# Patient Record
Sex: MALE | Race: WHITE | Employment: UNEMPLOYED | URBAN - METROPOLITAN AREA
[De-identification: names, ages, dates, MRNs, and addresses within clinical notes are randomized per-mention and may not be internally consistent; named-entity substitution may affect disease eponyms.]

---
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
## 2023-03-03 ENCOUNTER — TELEPHONE (OUTPATIENT)
Dept: DERMATOLOGY | Age: 18
End: 2023-03-03

## 2023-03-06 ENCOUNTER — TELEPHONE (OUTPATIENT)
Dept: DERMATOLOGY | Age: 18
End: 2023-03-06

## 2023-03-08 ENCOUNTER — TELEPHONE (OUTPATIENT)
Dept: DERMATOLOGY | Age: 18
End: 2023-03-08

## 2023-03-08 NOTE — TELEPHONE ENCOUNTER
Insurance company informed PA for continuation of therapy was sent 3/8/2023 and they will inform patient.

## 2023-03-10 ENCOUNTER — TELEPHONE (OUTPATIENT)
Dept: DERMATOLOGY | Age: 18
End: 2023-03-10

## 2023-03-10 NOTE — TELEPHONE ENCOUNTER
Pts mom called and states his insurance denied Cosentyx and would like to know if his work insurance is now his primary or secondary insurance.  I discussed with mom to have pt call his insurance companies to determine

## 2023-03-14 ENCOUNTER — TELEPHONE (OUTPATIENT)
Dept: DERMATOLOGY | Age: 18
End: 2023-03-14

## 2023-03-14 NOTE — TELEPHONE ENCOUNTER
Patient's grandmother, Danie Jack, brought  in his insurance cards to the Dermatology Office. The patient wants his insurance cards faxed to his insurance companies to approve his medication. He wants to get his medication asap.

## 2023-03-14 NOTE — TELEPHONE ENCOUNTER
I called and spoke to [employer]. They state they dont have anything from us since last May. PA has been sent and scanned in.

## 2023-03-21 NOTE — TELEPHONE ENCOUNTER
I informed the patients grandmother when she called that the PA has been submitted with the insurance cards, and put [facility] on there so that it can be started asap. Once we submit it to [facility], that is out of our control  how long it takes unfortunately.

## 2023-05-15 ENCOUNTER — OFFICE VISIT (OUTPATIENT)
Dept: DERMATOLOGY | Age: 18
End: 2023-05-15
Payer: COMMERCIAL

## 2023-05-15 ENCOUNTER — HOSPITAL ENCOUNTER (OUTPATIENT)
Age: 18
Setting detail: SPECIMEN
Discharge: HOME | End: 2023-05-15

## 2023-05-15 VITALS
DIASTOLIC BLOOD PRESSURE: 73 MMHG | HEIGHT: 68 IN | TEMPERATURE: 98.6 F | SYSTOLIC BLOOD PRESSURE: 114 MMHG | HEART RATE: 64 BPM | OXYGEN SATURATION: 98 % | BODY MASS INDEX: 19.73 KG/M2 | WEIGHT: 130.2 LBS

## 2023-05-15 DIAGNOSIS — Z79.899 HIGH RISK MEDICATION USE: ICD-10-CM

## 2023-05-15 DIAGNOSIS — L40.9 PSORIASIS: Primary | ICD-10-CM

## 2023-05-15 LAB
ALBUMIN SERPL-MCNC: 4.4 G/DL (ref 3.5–5.2)
ALBUMIN/GLOB SERPL: 1.5 {RATIO} (ref 1–2.5)
ALP SERPL-CCNC: 64 U/L (ref 40–129)
ALT SERPL-CCNC: 15 U/L (ref 5–41)
ANION GAP SERPL CALCULATED.3IONS-SCNC: 9 MMOL/L (ref 9–17)
AST SERPL-CCNC: 14 U/L
BASOPHILS # BLD: 0.05 K/UL (ref 0–0.2)
BASOPHILS # BLD: 1 % (ref 0–2)
BILIRUB SERPL-MCNC: 0.4 MG/DL (ref 0.3–1.2)
BUN SERPL-MCNC: 9 MG/DL (ref 6–20)
CALCIUM SERPL-MCNC: 9.6 MG/DL (ref 8.6–10.4)
CHLORIDE SERPL-SCNC: 103 MMOL/L (ref 98–107)
CO2 SERPL-SCNC: 28 MMOL/L (ref 20–31)
CREAT SERPL-MCNC: 0.86 MG/DL (ref 0.7–1.2)
EOSINOPHIL # BLD: 0.3 K/UL (ref 0–0.44)
EOSINOPHILS RELATIVE PERCENT: 5 % (ref 1–4)
ERYTHROCYTE [DISTWIDTH] IN BLOOD BY AUTOMATED COUNT: 12 % (ref 11.8–14.4)
GFR SERPL CREATININE-BSD FRML MDRD: >60 ML/MIN/1.73M2
GLUCOSE SERPL-MCNC: 98 MG/DL (ref 70–99)
HCT VFR BLD AUTO: 48.2 % (ref 40.7–50.3)
HGB BLD-MCNC: 15.9 G/DL (ref 13–17)
IMM GRANULOCYTES # BLD AUTO: <0.03 K/UL (ref 0–0.3)
IMM GRANULOCYTES NFR BLD: 0 %
LYMPHOCYTES # BLD: 27 % (ref 25–45)
LYMPHOCYTES NFR BLD: 1.55 K/UL (ref 1.2–5.2)
MCH RBC QN AUTO: 29.3 PG (ref 25.2–33.5)
MCHC RBC AUTO-ENTMCNC: 33 G/DL (ref 28.4–34.8)
MCV RBC AUTO: 88.9 FL (ref 82.6–102.9)
MONOCYTES NFR BLD: 0.57 K/UL (ref 0.1–1.4)
MONOCYTES NFR BLD: 10 % (ref 2–8)
NEUTROPHILS NFR BLD: 57 % (ref 34–64)
NEUTS SEG NFR BLD: 3.37 K/UL (ref 1.8–8)
NRBC AUTOMATED: 0 PER 100 WBC
PLATELET # BLD AUTO: 203 K/UL (ref 138–453)
PMV BLD AUTO: 10.4 FL (ref 8.1–13.5)
POTASSIUM SERPL-SCNC: 5.1 MMOL/L (ref 3.7–5.3)
PROT SERPL-MCNC: 7.4 G/DL (ref 6.4–8.3)
RBC # BLD AUTO: 5.42 M/UL (ref 4.21–5.77)
SODIUM SERPL-SCNC: 140 MMOL/L (ref 135–144)
WBC OTHER # BLD: 5.9 K/UL (ref 4.5–13.5)

## 2023-05-15 PROCEDURE — 99214 OFFICE O/P EST MOD 30 MIN: CPT | Performed by: DERMATOLOGY

## 2023-05-15 NOTE — PROGRESS NOTES
Dermatology Patient Note  [address] [employer] [employer]  [address] [facility] [address]  [address]  Dept: [phone]  Dept Fax: [phone]      VISITDATE: 5/15/2023   REFERRING PROVIDER: No ref. provider found      Di Conrad is a 21 y.o. male  who presents today in the office for:    Follow-up (Cosentyx)      HISTORY OF PRESENT ILLNESS:  6 month psoriasis follow up. The patient has been on Cosentyx for several years with good control of psoriasis. He has recently had difficulty getting Cosentyx due to insurance issues and is interested in switching to an oral medication. He is currently flaring on the extremities. Last Cosentyx dose was 2 months ago. No recent infections or hospitalizations. No SE reported at this time. The patient does not drink alcohol. MEDICAL PROBLEMS:  Patient Active Problem List    Diagnosis Date Noted    Depression     Anxiety 09/08/2015    Asthma        CURRENT MEDICATIONS:   Current Outpatient Medications   Medication Sig Dispense Refill    albuterol sulfate HFA (VENTOLIN HFA) 108 (90 Base) MCG/ACT inhaler Inhale 2 puffs into the lungs every 4 hours as needed for Wheezing or Shortness of Breath 2 Inhaler 0    secukinumab, 300 MG Dose, (COSENTYX SENSOREADY, 300 MG,) 150 MG/ML SOAJ INJECT 300MG SUBCUTANEOUSLY  EVERY 4 WEEKS (Patient not taking: Reported on 5/15/2023) 2 mL 5    loperamide (IMODIUM) 2 MG capsule TAKE 1 CAPSULE BY MOUTH 4 TIMES DAILY IF NEEDED IN THE MORNING, AT NOON, IN THE EVENING AND AT BEDTIME FOR 10 DAYS FOR DIARRHEA (Patient not taking: Reported on 11/14/2022)      sertraline (ZOLOFT) 50 MG tablet TAKE 1/2 (ONE-HALF) TABLET BY MOUTH IN THE MORNING (Patient not taking: Reported on 11/14/2022)       No current facility-administered medications for this visit.        ALLERGIES:   Allergies   Allergen Reactions    Penicillins Hives and Other (See Comments)    Amoxicillin Hives       SOCIAL

## 2023-05-17 LAB
QUANTI TB GOLD PLUS: NEGATIVE
QUANTI TB1 MINUS NIL: 0 IU/ML (ref 0–0.34)
QUANTI TB2 MINUS NIL: 0 IU/ML (ref 0–0.34)
QUANTIFERON MITOGEN: >10 IU/ML
QUANTIFERON NIL: 0.01 IU/ML

## 2023-05-17 RX ORDER — FOLIC ACID 1 MG/1
1 TABLET ORAL DAILY
Qty: 90 TABLET | Refills: 1 | Status: SHIPPED | OUTPATIENT
Start: 2023-05-17

## 2023-07-13 ENCOUNTER — OFFICE VISIT (OUTPATIENT)
Dept: DERMATOLOGY | Age: 18
End: 2023-07-13

## 2023-07-13 VITALS
HEIGHT: 70 IN | RESPIRATION RATE: 20 BRPM | HEART RATE: 65 BPM | DIASTOLIC BLOOD PRESSURE: 68 MMHG | BODY MASS INDEX: 18.32 KG/M2 | WEIGHT: 128 LBS | TEMPERATURE: 97.9 F | SYSTOLIC BLOOD PRESSURE: 104 MMHG

## 2023-07-13 DIAGNOSIS — Z79.899 HIGH RISK MEDICATION USE: Primary | ICD-10-CM

## 2023-07-13 DIAGNOSIS — L40.9 PSORIASIS: ICD-10-CM

## 2023-11-15 ENCOUNTER — OFFICE VISIT (OUTPATIENT)
Dept: DERMATOLOGY | Age: 18
End: 2023-11-15
Payer: COMMERCIAL

## 2023-11-15 VITALS
BODY MASS INDEX: 18.64 KG/M2 | TEMPERATURE: 98.6 F | DIASTOLIC BLOOD PRESSURE: 68 MMHG | HEART RATE: 61 BPM | OXYGEN SATURATION: 99 % | SYSTOLIC BLOOD PRESSURE: 105 MMHG | WEIGHT: 123 LBS | HEIGHT: 68 IN

## 2023-11-15 DIAGNOSIS — L40.9 PSORIASIS: Primary | ICD-10-CM

## 2023-11-15 PROCEDURE — 99213 OFFICE O/P EST LOW 20 MIN: CPT | Performed by: DERMATOLOGY

## 2023-11-15 NOTE — PROGRESS NOTES
personally scribed the services dictated to me by Dr. Caprice Monet in this documentation. I, Dr. Caprice Monet, personally performed the services described in this documentation, as scribed by Marylene Paul in my presence, and it is both accurate and complete.

## 2024-02-05 ENCOUNTER — TELEPHONE (OUTPATIENT)
Dept: DERMATOLOGY | Age: 19
End: 2024-02-05

## 2024-02-05 NOTE — TELEPHONE ENCOUNTER
Please ask patient if he would like to pursue, as at last appt he did not. He is not a minor so we cannot let his mother direct his care without his permission. We discussed lower cost alternatives but he declined those as well. Can schedule VV if discussion needs to happen

## 2024-02-05 NOTE — TELEPHONE ENCOUNTER
Pt Mom called and stated that her son Christine is having a psoriasis flare up and it is also on his groin and can hardly walk. Last appt 11/15/25567 he did not want topicals, he was using other remedies. He also discontinued Cosentyx due to cost. Mom request a script sent for cosentyx. Please advise.

## 2024-02-09 NOTE — TELEPHONE ENCOUNTER
Spoke with Lisette and he states he is doing well enough right now to wait until his May 15 th appointment. He thinks the flare he was having may have been brought on by having covid.

## 2024-03-04 ENCOUNTER — TELEPHONE (OUTPATIENT)
Dept: DERMATOLOGY | Age: 19
End: 2024-03-04

## 2024-03-04 DIAGNOSIS — L40.9 PSORIASIS: ICD-10-CM

## 2024-03-04 NOTE — TELEPHONE ENCOUNTER
Patient called requesting a refill on his cosentyx- he has not been on the medication for over 8 months due to cost. Pt states that he has new insurance that he will call back with the information to get a new PA started for the medication but states for the time being it did help.

## 2024-03-05 NOTE — TELEPHONE ENCOUNTER
I ordered it but it's likely we'll need an appt to document the psoriasis severity so it can get approved by insurance

## 2024-03-11 ENCOUNTER — TELEPHONE (OUTPATIENT)
Dept: DERMATOLOGY | Age: 19
End: 2024-03-11

## 2024-03-11 DIAGNOSIS — L40.9 PSORIASIS: Primary | ICD-10-CM

## 2024-03-11 RX ORDER — IXEKIZUMAB 80 MG/ML
INJECTION, SOLUTION SUBCUTANEOUS
Qty: 3 ML | Refills: 0 | Status: SHIPPED | OUTPATIENT
Start: 2024-03-11

## 2024-03-11 RX ORDER — IXEKIZUMAB 80 MG/ML
INJECTION, SOLUTION SUBCUTANEOUS
Qty: 1 ML | Refills: 1 | Status: SHIPPED | OUTPATIENT
Start: 2024-03-11

## 2024-03-11 RX ORDER — IXEKIZUMAB 80 MG/ML
INJECTION, SOLUTION SUBCUTANEOUS
Qty: 2 ML | Refills: 1 | Status: SHIPPED | OUTPATIENT
Start: 2024-03-11

## 2024-03-11 NOTE — TELEPHONE ENCOUNTER
----- Message from Mehdi Lucas sent at 3/11/2024  8:15 AM EDT -----  Regarding: Cosentyx denial  Good morning,     I attempted to get an approved for [employer] to continue/ re-start COSENTYX.  Unfortunately his insurance plan fo no want to cover this medication citing there needs to be paid claims or medication reason to avoid/intolerance to THREE of the following: Cimzia, Humira, Amjevita, Cyletzo, Hyrimox, Skyrizi, Stelara, and Tremfya, Taltz (which is the preferred over Cosentyx).  The only medication I encountered seeing in his chart was Humira.  I did indicated that with a 1st line family hx of MS that Humira would want to be avoided.  Please review and let me know what next steps your office would like to take in getting this patient started on therapy again.        Thanks,   Mehdi

## 2024-03-12 NOTE — TELEPHONE ENCOUNTER
Pt has been informed about having to try out taltz due to his insurance, he stated understanding but also mentioned there is a research study group going on in [facility] for Izokivep- Izokibep is a potent and selective inhibitor of interleukin (IL)-17A that is being developed for treatment of psoriatic arthritis (PsA). This study will evaluate the efficacy of izokibep in subjects with PsA.  Pt states that he is okay with trying the taltz but wanted to know what you thought about him possibly trying to be apart of the case study?

## 2024-03-13 NOTE — TELEPHONE ENCOUNTER
Dr. Yara Garcia [phone] (dermatologist) phoned regarding PA for Luke. Advised PA is being denied because he has to have a documented try/fail Humira, skyrizi, Enbrel, Cimzia or Tremfya. Please advise.

## 2024-03-13 NOTE — TELEPHONE ENCOUNTER
AntiTNFs (Humira, its biosimilars, Enbrel, and Cimzia) are contraindicated because patient has a first degree relative with multiple sclerosis. I would be ok with IL-23 such as Skyrizi or Tremfya. Instead of continuing to guess which of the preferred meds are \"more\" preferred, can we find out which to prescribe before I waste everyone's time with a PA that will be denied?

## 2024-03-13 NOTE — TELEPHONE ENCOUNTER
This is a personal decision. I am certainly not opposed to participating in research. He would need to discuss with the doctors conducting the studies the risks/benefits of the study drug. I am not familiar with it, but the mechanism of action seems appropriate for psoriasis. If it is a placebo controlled trial, he may not receive study drug.    I feel fairly certain that he will not qualify to participate in the study if he is on Taltz or has recently been on Taltz

## 2024-08-14 ENCOUNTER — TELEPHONE (OUTPATIENT)
Dept: DERMATOLOGY | Age: 19
End: 2024-08-14

## 2024-08-14 NOTE — TELEPHONE ENCOUNTER
Received letter from [employer], they are trying to get a hold of patient . I called patient and did speak with him and told him to please call [employer]. H said he does know he had a message from them but that he works nights and its hard for him to phone them. But he said he would asap.

## 2024-10-15 DIAGNOSIS — L40.9 PSORIASIS: ICD-10-CM

## 2024-10-18 NOTE — LETTER
[facility] ([facility]) Dermatology  [address]  [facility]  Phone: [phone]  Fax: [phone]    [facility]        February 2, 2021     Patient: Justina Conrad   YOB: 2005   Date of Visit: 2/2/2021       To Whom it May Concern:    Justina Conrad was seen in my clinic on 2/2/2021. He may return to school on 02/02/2021. If you have any questions or concerns, please don't hesitate to call.     Sincerely,         SCHEDULE, MHPX  DERMATOLOGY 18-Oct-2024 09:30

## 2024-11-13 DIAGNOSIS — L40.9 PSORIASIS: ICD-10-CM

## 2024-11-13 RX ORDER — GUSELKUMAB 100 MG/ML
INJECTION SUBCUTANEOUS
Qty: 1 ML | OUTPATIENT
Start: 2024-11-13

## 2024-11-21 DIAGNOSIS — L40.9 PSORIASIS: ICD-10-CM

## 2024-11-22 RX ORDER — GUSELKUMAB 100 MG/ML
INJECTION SUBCUTANEOUS
Qty: 1 ML | OUTPATIENT
Start: 2024-11-22

## 2024-11-29 ENCOUNTER — TELEPHONE (OUTPATIENT)
Dept: DERMATOLOGY | Age: 19
End: 2024-11-29

## 2024-11-29 NOTE — TELEPHONE ENCOUNTER
Patient mom called into the office very upset that her son has a current PS flare up, and is upset that the last 3 refill request for his tremfya has been denied. I Informed her that since we have not seen him in over a year and he left his 03/26/2024 appt without being seen, and no showed his last visit on 05/15/2024 we legally can not refill his medication for tremfya because we have to see him to document the body surface areas for insurance, as well to make sure the current tx plan is still effective for his PS. Patient mom was very upset, started to use inappropriate language towards me and demanded to talk to the  since per her words \"so your just going to let my son walk around without his medicine\". Again explained to the patients mom that he needs to be seen. I placed her on hold and she hung up.  Patient also informed staff on 03/26/2024 that he was not taking the tremfya for his PS.

## 2025-04-02 ENCOUNTER — TELEMEDICINE (OUTPATIENT)
Age: 20
End: 2025-04-02
Payer: COMMERCIAL

## 2025-04-02 DIAGNOSIS — L40.9 PSORIASIS: Primary | ICD-10-CM

## 2025-04-02 DIAGNOSIS — Z79.899 HIGH RISK MEDICATION USE: ICD-10-CM

## 2025-04-02 PROCEDURE — 99214 OFFICE O/P EST MOD 30 MIN: CPT | Performed by: DERMATOLOGY

## 2025-04-03 NOTE — PROGRESS NOTES
TELEHEALTH EVALUATION -- Audio/Visual    Dermatology Patient Note  [facility]  [facility] DERMATOLOGY  [address]  [place] [address]  Dept: [phone]  Dept Fax: [phone]      VISITDATE: 4/2/2025   REFERRING PROVIDER: No ref. provider found      Lisette Conrad is a 19 y.o. male  who presents today in the office for:    No chief complaint on file.      HISTORY OF PRESENT ILLNESS:  Patient with psoriasis presents for f/u. Last seen in November of 2023, at which time he had stopped Cosentyx due to insurance denials and had decided to treat his psoriasis with supplements. Since then he did have a flare and called to tell us he wanted to start treatment again. Due to Tremfya being a preferred agent, it was prescribed in March 2024. Patient no showed his follow-up in May 2024 and has not been seen since.    Today patient states his psoriasis has been flaring. It involves most of his scalp, his face, and his genitals. It is very painful and itchy. He is not using creams because he states they have never worked and cause pain when he applies them. He reports that Tremfya worked very well and his psoriasis completely cleared, but he ran out several months ago and has been flaring. He denies having any side effects on Tremfya. He denied frequent infections, needing to take antibiotics for any illness, and denies any hospitalizations or other major illnesses. He has not yet had his repeat TB test completed, last was done in 2023.    CURRENT MEDICATIONS:   Current Outpatient Medications   Medication Sig Dispense Refill    Cholecalciferol (VITAMIN D) 50 MCG (2000 UT) CAPS capsule Take by mouth      Guselkumab (TREMFYA) 100 MG/ML SOPN Inject 100 mg SQ at weeks 0 and 4 (Patient not taking: Reported on 3/26/2024) 2 mL 0    Guselkumab (TREMFYA) 100 MG/ML SOPN Inject 100 mg SQ every 8 weeks (Patient not taking: Reported on 3/26/2024) 1 mL 1    folic acid (FOLVITE) 1 MG tablet Take 1

## 2025-04-09 ENCOUNTER — HOSPITAL ENCOUNTER (OUTPATIENT)
Age: 20
Setting detail: SPECIMEN
Discharge: HOME | End: 2025-04-09

## 2025-04-09 DIAGNOSIS — Z79.899 HIGH RISK MEDICATION USE: ICD-10-CM

## 2025-04-11 LAB
QUANTI TB GOLD PLUS: NEGATIVE
QUANTI TB1 MINUS NIL: 0 IU/ML
QUANTI TB2 MINUS NIL: 0 IU/ML
QUANTIFERON MITOGEN: 9.97 IU/ML
QUANTIFERON NIL: 0.03 IU/ML

## 2025-04-13 ENCOUNTER — RESULTS FOLLOW-UP (OUTPATIENT)
Age: 20
End: 2025-04-13

## 2025-04-13 DIAGNOSIS — L40.9 PSORIASIS: Primary | ICD-10-CM

## 2025-04-13 RX ORDER — GUSELKUMAB 100 MG/ML
INJECTION SUBCUTANEOUS
Qty: 1 ML | Refills: 1 | Status: SHIPPED | OUTPATIENT
Start: 2025-04-13

## 2025-04-13 RX ORDER — GUSELKUMAB 100 MG/ML
INJECTION SUBCUTANEOUS
Qty: 2 ML | Refills: 0 | Status: SHIPPED | OUTPATIENT
Start: 2025-04-13

## 2025-05-22 ENCOUNTER — OFFICE VISIT (OUTPATIENT)
Age: 20
End: 2025-05-22
Payer: COMMERCIAL

## 2025-05-22 VITALS
WEIGHT: 127 LBS | TEMPERATURE: 98.8 F | SYSTOLIC BLOOD PRESSURE: 102 MMHG | HEART RATE: 65 BPM | BODY MASS INDEX: 19.25 KG/M2 | DIASTOLIC BLOOD PRESSURE: 62 MMHG | OXYGEN SATURATION: 99 % | HEIGHT: 68 IN

## 2025-05-22 DIAGNOSIS — L40.50 PSORIATIC ARTHRITIS (HCC): ICD-10-CM

## 2025-05-22 DIAGNOSIS — L40.9 PSORIASIS: Primary | ICD-10-CM

## 2025-05-22 PROCEDURE — 99214 OFFICE O/P EST MOD 30 MIN: CPT | Performed by: DERMATOLOGY

## 2025-05-22 RX ORDER — ZINC GLUCONATE 50 MG
50 TABLET ORAL DAILY
COMMUNITY

## 2025-05-22 RX ORDER — MAGNESIUM 30 MG
30 TABLET ORAL 2 TIMES DAILY
COMMUNITY

## 2025-05-22 NOTE — PROGRESS NOTES
Dermatology Patient Note  [facility]  [facility] DERMATOLOGY  [address] [place] [place] [address]  Dept: [phone]  Dept Fax: [phone]      VISITDATE: 5/22/2025   REFERRING PROVIDER: No ref. provider found      Lisette Conrad is a 19 y.o. male  who presents today in the office for:    Other (Patient presents today for a follow up of psoriasis of the face, groin, legs,, back, ears and arms. He has had one Tremfya about two weeks ago,, no side effects. He also states he has been having joint pain. )      HISTORY OF PRESENT ILLNESS:  As above. Patient presents for a follow up for psoriasis flare. At the last visit, 11/15/23, patient discontinued Consentyx and declined topicals.     Today, patient experiences flares affecting his face, groin, UE and LE, ears, and arms. He received his first Tremfya injection two weeks ago and appreciated mild benefits in his groin which is now discolored. Reports topicals have never demonstrated significant benefit.    Psoriasis flares radiated in moderation to UE and back; he has not noticed new patches since starting Tremfya. He experiences severe joint pain primarily in his lower ext joints and swelling of his knuckles. Notes leakage of serous fluid from psoriasis flare on his beard/jawline. Currently, he is using coconut oil. Previously when he was on Consentyx, he did not achieve clearance on his LE; was also on tremfya and achieved clearance on his face and scalp.     MEDICAL PROBLEMS:  Patient Active Problem List    Diagnosis Date Noted    Depression     Anxiety 09/08/2015    Asthma        CURRENT MEDICATIONS:   Current Outpatient Medications   Medication Sig Dispense Refill    magnesium 30 MG tablet Take 1 tablet by mouth 2 times daily      zinc gluconate 50 MG tablet Take 1 tablet by mouth daily      guselkumab (TREMFYA) 100 MG/ML SOSY injection Inject 100 mg SQ at weeks 0 and 4 2 mL 0    guselkumab (TREMFYA) 100 MG/ML SOSY injection

## 2025-05-29 ENCOUNTER — TELEPHONE (OUTPATIENT)
Age: 20
End: 2025-05-29

## 2025-05-29 NOTE — TELEPHONE ENCOUNTER
Writer called to get the phone number form the back of [facility] Insurance card. Patient stated he will call back tomorrow as he is far out right now.

## 2025-08-21 ENCOUNTER — OFFICE VISIT (OUTPATIENT)
Age: 20
End: 2025-08-21
Payer: COMMERCIAL

## 2025-08-21 VITALS
SYSTOLIC BLOOD PRESSURE: 103 MMHG | HEIGHT: 70 IN | BODY MASS INDEX: 18.98 KG/M2 | OXYGEN SATURATION: 99 % | TEMPERATURE: 98 F | DIASTOLIC BLOOD PRESSURE: 67 MMHG | WEIGHT: 132.6 LBS | HEART RATE: 62 BPM

## 2025-08-21 DIAGNOSIS — L40.50 PSORIATIC ARTHRITIS (HCC): ICD-10-CM

## 2025-08-21 DIAGNOSIS — L40.9 PSORIASIS: Primary | ICD-10-CM

## 2025-08-21 DIAGNOSIS — Z79.899 HIGH RISK MEDICATION USE: ICD-10-CM

## 2025-08-21 PROCEDURE — 99214 OFFICE O/P EST MOD 30 MIN: CPT | Performed by: DERMATOLOGY

## 2025-08-21 RX ORDER — CALCIPOTRIENE, BETAMETHASONE DIPROPIONATE 50; .643 UG/G; MG/G
OINTMENT TOPICAL
Qty: 60 G | Refills: 3 | Status: SHIPPED | OUTPATIENT
Start: 2025-08-21

## 2025-08-27 DIAGNOSIS — L40.9 PSORIASIS: ICD-10-CM

## 2025-08-27 RX ORDER — GUSELKUMAB 100 MG/ML
INJECTION SUBCUTANEOUS
Qty: 1 ML | Refills: 2 | Status: ACTIVE | OUTPATIENT
Start: 2025-08-27